# Patient Record
Sex: MALE | HISPANIC OR LATINO | URBAN - METROPOLITAN AREA
[De-identification: names, ages, dates, MRNs, and addresses within clinical notes are randomized per-mention and may not be internally consistent; named-entity substitution may affect disease eponyms.]

---

## 2018-12-03 ENCOUNTER — EMERGENCY (EMERGENCY)
Facility: HOSPITAL | Age: 48
LOS: 0 days | Discharge: HOME | End: 2018-12-03
Attending: EMERGENCY MEDICINE | Admitting: EMERGENCY MEDICINE
Payer: MEDICAID

## 2018-12-03 VITALS
OXYGEN SATURATION: 100 % | DIASTOLIC BLOOD PRESSURE: 90 MMHG | RESPIRATION RATE: 18 BRPM | TEMPERATURE: 97 F | SYSTOLIC BLOOD PRESSURE: 151 MMHG | HEART RATE: 88 BPM

## 2018-12-03 DIAGNOSIS — S62.614A DISPLACED FRACTURE OF PROXIMAL PHALANX OF RIGHT RING FINGER, INITIAL ENCOUNTER FOR CLOSED FRACTURE: ICD-10-CM
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
## 2018-12-03 PROCEDURE — 73130 X-RAY EXAM OF HAND: CPT | Mod: 26,RT

## 2018-12-03 PROCEDURE — 99283 EMERGENCY DEPT VISIT LOW MDM: CPT | Mod: 25

## 2018-12-03 PROCEDURE — 29130 APPL FINGER SPLINT STATIC: CPT | Mod: F8

## 2018-12-03 NOTE — ED STATDOCS - CARE PLAN
Principal Discharge DX:	Closed displaced fracture of proximal phalanx of right ring finger, initial encounter
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
## 2018-12-03 NOTE — ED STATDOCS - PROGRESS NOTE DETAILS
AMADO Dickey:   Patient has been seen, evaluated and orders have been written by the attending in intake. Patient is stable.  I will follow up the results of orders written and I will continue to evaluate/observe the patient..  Swollen ,tender R $th finger , proximal aspect / PIP joint.  Oxana Dickey PA-C Discussed with Dr. mcbride.  Reported comminuted fx to R 4th proximal phalanx.  Directed to splint finger and refer to office tomorrow for further eval.  Oxana Dickey PA-C

## 2018-12-03 NOTE — ED ADULT NURSE NOTE - NSIMPLEMENTINTERV_GEN_ALL_ED
Implemented All Universal Safety Interventions:  [place] to call system. Call bell, personal items and telephone within reach. Instruct patient to call for assistance. Room bathroom lighting operational. Non-slip footwear when patient is off stretcher. Physically safe environment: no spills, clutter or unnecessary equipment. Stretcher in lowest position, wheels locked, appropriate side rails in place.

## 2024-10-31 ENCOUNTER — EMERGENCY (EMERGENCY)
Facility: HOSPITAL | Age: 54
LOS: 0 days | Discharge: HOME | End: 2024-10-31
Attending: EMERGENCY MEDICINE
Payer: MEDICAID

## 2024-10-31 VITALS
RESPIRATION RATE: 19 BRPM | OXYGEN SATURATION: 100 % | WEIGHT: 149.69 LBS | TEMPERATURE: 98 F | DIASTOLIC BLOOD PRESSURE: 94 MMHG | SYSTOLIC BLOOD PRESSURE: 142 MMHG | HEART RATE: 73 BPM
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
## 2024-10-31 DIAGNOSIS — S61.012A LACERATION WITHOUT FOREIGN BODY OF LEFT THUMB WITHOUT DAMAGE TO NAIL, INITIAL ENCOUNTER: ICD-10-CM

## 2024-10-31 DIAGNOSIS — S62.522B DISPLACED FRACTURE OF DISTAL PHALANX OF LEFT THUMB, INITIAL ENCOUNTER FOR OPEN FRACTURE: ICD-10-CM
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
## 2024-10-31 PROCEDURE — 73140 X-RAY EXAM OF FINGER(S): CPT | Mod: 26,LT

## 2024-10-31 PROCEDURE — 12002 RPR S/N/AX/GEN/TRNK2.6-7.5CM: CPT

## 2024-10-31 PROCEDURE — 73140 X-RAY EXAM OF FINGER(S): CPT | Mod: LT

## 2024-10-31 PROCEDURE — 90715 TDAP VACCINE 7 YRS/> IM: CPT

## 2024-10-31 PROCEDURE — 99285 EMERGENCY DEPT VISIT HI MDM: CPT

## 2024-10-31 PROCEDURE — 99284 EMERGENCY DEPT VISIT MOD MDM: CPT | Mod: 25

## 2024-10-31 RX ORDER — OXYCODONE HYDROCHLORIDE 30 MG/1
1 TABLET, FILM COATED, EXTENDED RELEASE ORAL
Qty: 6 | Refills: 0
Start: 2024-10-31 | End: 2024-11-01

## 2024-10-31 RX ORDER — CEPHALEXIN 500 MG
1 CAPSULE ORAL
Qty: 28 | Refills: 0
Start: 2024-10-31 | End: 2024-11-06

## 2024-10-31 RX ORDER — CEPHALEXIN 500 MG
1 CAPSULE ORAL
Qty: 28 | Refills: 0
Start: 2024-10-31 | End: 2024-12-29

## 2024-10-31 RX ORDER — CEFAZOLIN SODIUM 1 G
2000 VIAL (EA) INJECTION ONCE
Refills: 0 | Status: COMPLETED | OUTPATIENT
Start: 2024-10-31 | End: 2024-10-31

## 2024-10-31 RX ORDER — TETANUS TOXOID, REDUCED DIPHTHERIA TOXOID AND ACELLULAR PERTUSSIS VACCINE, ADSORBED 5; 2.5; 8; 8; 2.5 [IU]/.5ML; [IU]/.5ML; UG/.5ML; UG/.5ML; UG/.5ML
0.5 SUSPENSION INTRAMUSCULAR ONCE
Refills: 0 | Status: COMPLETED | OUTPATIENT
Start: 2024-10-31 | End: 2024-10-31

## 2024-10-31 RX ADMIN — TETANUS TOXOID, REDUCED DIPHTHERIA TOXOID AND ACELLULAR PERTUSSIS VACCINE, ADSORBED 0.5 MILLILITER(S): 5; 2.5; 8; 8; 2.5 SUSPENSION INTRAMUSCULAR at 14:29

## 2024-10-31 RX ADMIN — Medication 2000 MILLIGRAM(S): at 14:18

## 2024-10-31 NOTE — CONSULT NOTE ADULT - SUBJECTIVE AND OBJECTIVE BOX
Pt is a 54y Male who presents to the ED with L thumb lac after using power saw at home. Denies head injury, LOC, other injuries. Denies numbness, tingling, fevers, chills, CP, SOB, N/V/D.    Vital Signs (24 Hrs):  T(C): 36.8 (10-31-24 @ 13:54), Max: 36.8 (10-31-24 @ 13:54)  HR: 73 (10-31-24 @ 13:54) (73 - 73)  BP: 142/94 (10-31-24 @ 13:54) (142/94 - 142/94)  RR: 19 (10-31-24 @ 13:54) (19 - 19)  SpO2: 100% (10-31-24 @ 13:54) (100% - 100%)    LABS:      Physical Exam:  General: NAD, pt laying in bed comfortably    LEFT Upper Extremity:   Large lac over volar side of L thumb   C5-T1 SILT  Motor grossly intact throughout axillary/musculocutaneous/radial/median/ulnar/AIN/PIN nerves  + radial pulse  Compartments soft and compressible    Imaging:  XR L Hand: thumb DIP Fx    Procedure: Lac repair  Procedure explained and discussed risks, benefits, and alternatives to procedure with patient at bedside. Patient expressed understanding and all questions were answered. Laceration was closed with sutures and dry dressings were applied. The patient tolerated the procedure well and there were no complications. The patient was neurovascularly intact post-procedure.     A/P:  54y Male who presents with L thumb fx/lac    NWB LUE, keep dressings C/D/I  Analgesics  DC on oral ABx  No acute orthopaedic interventions at this time  Stable for discharge from orthopaedic standpoint   Follow up with Dr. Martinez in office for further management in 1 week  Ortho to sign off, can reconsult with any changes in clinical course  Will discuss plan with Dr. Martinez and notify primary team of any changes to plan

## 2024-10-31 NOTE — ED STATDOCS - PATIENT PORTAL LINK FT
You can access the FollowMyHealth Patient Portal offered by [facility] by registering at the following website: [email or web address]. By joining [employer]’s FollowMyHealth portal, you will also be able to view your health information using other applications (apps) compatible with our system.

## 2024-10-31 NOTE — ED ADULT TRIAGE NOTE - CHIEF COMPLAINT QUOTE
Pt c/o deep laceration to the L thumb while cutting wood at home. No active bleeding at this time. Denies AC/ASA use.

## 2024-10-31 NOTE — ED ADULT NURSE NOTE - OBJECTIVE STATEMENT
pt presenting w/left thumb laceration w/a automatic saw at home while cutting wood. Pt IV and ABX and tetanus completed and pt waiting for ortho

## 2024-10-31 NOTE — ED STATDOCS - PROGRESS NOTE DETAILS
laceration to left tuft of thumb.  Open fracture on xray.  Orthopedic team repaired laceration and to be DC on Keflex.  Follow with Dr. Martinez.  Alexia Flores PA-C 54 year old male with no pertinent PMHx presenting to the ED c/o deep laceration to the left thumb from power saw while cutting wood at home. Pt denies AC/ASA use. Pt is unsure of last of date of last tetanus shot. Pt has no known medical allergies.  Alexia Flores PA-C

## 2024-10-31 NOTE — ED STATDOCS - NSFOLLOWUPINSTRUCTIONS_ED_ALL_ED_FT
Cuidado de un desgarro, en adultos  Laceration Care, Adult  Un desgarro es un javi que puede atravesar todas las capas de la piel hasta el tejido que se encuentra debajo de la piel. Algunos desgarros cicatrizan por sí solos. Otros se deben cerrar con puntos (suturas), grapas, tiras adhesivas para la piel o goma para cerrar la piel.    El cuidado adecuado de un desgarro reduce el riesgo de infección, ayuda a que el desgarro cierre mejor, y puede prevenir la formación de cicatrices.    Consejos generales  Mantenga la herida limpia y seca.  No se rasque ni se toque la herida.  Lávese las adolfo con agua y jabón fabiola al menos 20 segundos antes y después de tocar la herida o cambiarse la venda (vendaje). Use desinfectante para adolfo si no dispone de agua y jabón.  No usedesinfectantes ni antisépticos, mindi alcohol para frotar, para limpiar la herida, a menos que se lo indique ochoa médico.  Si le colocaron un vendaje, cámbielo al menos pamela vez al día o mindi se lo haya indicado el médico. También debe cambiarlo si se moja o se ensucia.  Cómo cuidar del desgarro  Si se utilizaron suturas o grapas:    Mantenga la herida completamente seca fabiola las primeras 24 horas o mindi se lo haya indicado el médico. Transcurrido rickey tiempo, puede ducharse o nathalia ele de inmersión. No se sumerja en agua hasta que le hayan quitado las suturas o grapas.  Limpie la herida pamela vez por día o mindi se lo haya indicado el médico. Para hacer esto:  Lave la herida con agua y jabón.  Enjuáguela con agua para quitar todo el jabón.  Séquela dando palmaditas con pamela toalla limpia. No frote la herida.  Después de limpiar la herida, aplique pamela delgada capa de ungüento con antibiótico, otros ungüentos tópicos, o un vendaje no adherente mindi se lo haya indicado el médico. Renovo ayudará a prevenir infecciones y a evitar que el vendaje se adhiera a la herida.  Medardo que le retiren las suturas o las grapas mindi se lo haya indicado el médico. No retire las suturas ni las grapas usted mismo.  Si se utilizaron tiras adhesivas para la piel:    No deje que las tiras adhesivas para la piel se mojen. Puede bañarse o ducharse, los tenga cuidado de mantener la herida seca.  Si se moja, séquela dando palmaditas con pamela toalla limpia. No frote la herida.  Las tiras adhesivas para la piel se caen solas. Si los bordes de las tiras adhesivas empiezan a despegarse y enroscarse, puede recortar los que estén sueltos. No retire las tiras adhesivas por completo a menos que el médico se lo indique.  Si se utilizó goma para cerrar la piel:    Puede bañarse o ducharse, los tenga cuidado de mantener la herida seca. No sumerja la herida en agua.  Después de ducharse o darse un baño, seque el javi dando palmaditas con pamela toalla limpia. No frote la herida.  No practique actividades que lo allen transpirar mucho hasta que la goma para cerrar la piel se haya caído noah.  No aplique líquidos, cremas ni ungüentos medicinales en la herida mientras todavía tenga la goma para cerrar la piel. De lo contrario, puede aflojar la película antes de que la herida cicatrice.  Si la herida está cubierta con un vendaje, no aplique cinta adhesiva directamente sobre la goma para cerrar la piel. De lo contrario, puede hacer que la goma se despegue antes de que la herida cicatrice.  No toque la goma. La goma para cerrar la piel generalmente permanece sobre la piel de 5 a 10 días y luego se  noah.  Siga estas instrucciones en ochoa casa:  Medicamentos    Use los medicamentos de venta scarlet y los recetados solamente mindi se lo haya indicado el médico.  Si le recetaron un medicamento o ungüento con antibiótico, tómelo o aplíqueselo mindi se lo haya indicado el médico. No deje de usarlo aunque la afección mejore.  Control del dolor y la hinchazón    Si se lo indican, aplique hielo sobre la faina de la lesión. Para hacer esto:  Ponga el hielo en pamela bolsa plástica.  Coloque pamela toalla entre la piel y la bolsa.  Aplique el hielo fabiola 20 minutos, 2 o 3 veces por día.  Retire el hielo si la piel se pone de color ontiveros brillante. Renovo es muy importante. Si no puede sentir dolor, calor o frío, tiene un mayor riesgo de que se dañe la faina.  Fabiola las primeras 24 a 48 horas después de que le hayan reparado el desgarro, cuando esté sentado o acostado, levante (eleve) la faina de la lesión por encima del nivel del corazón.  Instrucciones generales    Two wounds closed with skin glue. One is normal. The other is red with pus and infected.  Evite cualquier actividad que pueda hacer que el desgarro vuelva a abrirse.  Controle la herida todos los días para detectar signos de infección. Esté atento a lo siguiente:  Aumento del enrojecimiento, la hinchazón o el dolor.  Líquido o bud.  Calor.  Pus o mal olor.  Concurra a todas las visitas de seguimiento. Renovo es importante.  Comuníquese con un médico si:  Le aplicaron la vacuna antitetánica y tiene hinchazón, dolor intenso, enrojecimiento o hemorragia en el sitio de la inyección.  La herida cerrada se abre.  Tiene cualquiera de estos signos de infección:  Más enrojecimiento, hinchazón o dolor alrededor de la herida.  Líquido o bud que salen de la herida.  Calor que proviene de la herida.  Pus o mal olor proveniente de la herida.  Fiebre.  Nota un cuerpo extraño en la herida, mindi un trozo de hodges o cecilia.  El dolor no se christelle con los medicamentos.  Observa que la piel cerca de la herida cambia de color.  Necesita cambiar el vendaje con frecuencia.  Presenta pamela nueva erupción cutánea.  Tiene entumecimiento alrededor de la herida.  Solicite ayuda de inmediato si:  Tiene mucha hinchazón alrededor de la herida.  El dolor aumenta repentinamente y es intenso.  Presenta nódulos dolorosos cerca de la herida o en la piel en cualquier faina del cuerpo.  Tiene pamela línea rizwana que sale de la herida.  La herida está en la mano o en el pie, y no puede  correctamente josh de los dedos.  La herida está en la mano o en el pie y observa que los dedos tienen un harper pálido o azulado.  Resumen  Un desgarro es un javi que puede atravesar todas las capas de la piel hasta el tejido que se encuentra debajo de la piel.  Algunos desgarros cicatrizan por sí solos. Otros se deben cerrar con puntos (suturas), grapas, tiras adhesivas para la piel o goma para cerrar la piel.  El cuidado adecuado de un desgarro reduce el riesgo de infección, ayuda a que el desgarro cierre mejor, y puede prevenir la formación de cicatrices.  Esta información no tiene mindi fin reemplazar el consejo del médico. Asegúrese de hacerle al médico cualquier pregunta que tenga.    Fractura de dedo en los adultos  Finger Fracture, Adult  La fractura de dedo es la rotura de cualquiera de los huesos de los dedos. El médico puede ponerle pamela férula o un yeso en el dedo para que no se mueva mientras reyna.    ¿Cuáles son las causas?  La principal causa de pamela fractura de dedo es pamela lesión por lo siguiente:  Deportes.  Pamela caída.  Apretarse el dedo en un cajón o kwesi.  ¿Qué incrementa el riesgo?  Practicar deportes.  Trabajar con máquinas.  Tener pamela afección llamada osteoporosis que hace que los huesos se vuelvan frágiles.  ¿Cuáles son los signos o síntomas?  Dolor, moretones e hinchazón poco después de la lesión.  Rigidez.  Huesos expuestos, en casos muy graves. Renovo se denomina fractura compuesta o fractura abierta.  ¿Cómo se diagnostica?  Un examen físico.  Wen antecedentes médicos.  Wen síntomas.  Pamela radiografía.  ¿Cómo se trata?  El tratamiento depende de la gravedad de la fractura. Si los huesos aún están en ochoa lugar, el médico puede ponerle el dedo en pamela férula. Si hay varios dedos fracturados, es posible que necesite un yeso. Se puede colocar un yeso hasta el codo. Renovo evitará que los dedos y la mano se muevan.    Si los huesos están fuera de lugar, el médico puede reacomodarlos o es posible que tenga que someterse a pamela cirugía.    Es posible que también tenga que hacer ejercicios de fisioterapia para ayudar a que el dedo se mueva mejor y se fortalezca.    Siga estas instrucciones en ochoa casa:  Si tiene pamela férula que se puede retirar:    Hand showing finger splint on fingers and wrist.  Use la férula mindi se lo haya indicado el médico. Quítesela solo si el médico lo autoriza.  Controle todos los días la piel a ochoa alrededor. Informe al médico si observa problemas.  Afloje la férula si los dedos se le adormecen, siente hormigueos o se le enfrían y se tornan azulados.  Mantenga la férula limpia y seca.  Si tiene un yeso que no se puede quitar:    No ejerza presión en ninguna parte del yeso hasta que se haya endurecido. Renovo puede tardar algunas horas.  No introduzca nada en el interior para rascarse la piel. Renovo puede incrementar el riesgo de contraer pamela infección.  Controle la piel alrededor del yeso todos los días. Informe al médico si observa problemas.  Puede aplicar pamela loción en la piel seca alrededor del yeso. No aplique loción en la piel por debajo del yeso.  Mantenga el yeso seco y limpio.  Bañarse    No se dé ele de inmersión, no nade ni use un jacuzzi hasta que se lo autoricen. Pregunte si puede ducharse.  Si la férula o el yeso no son impermeables:  No deje que se mojen.  Cúbralos para ducharse o bañarse. Use pamela cubierta que no permita que entre agua.  Control del dolor, la rigidez y la hinchazón    Si se lo indican, aplique hielo sobre la faina.  Si tiene pamela férula que se puede sacar, quítesela mindi se lo hayan indicado.  Ponga el hielo en pamela bolsa plástica.  Coloque pamela toalla entre la piel y la bolsa o entre el yeso y la bolsa.  Aplique el hielo fabiola 20 minutos, 2 a 3 veces por día.  Si la piel se le pone de color ontiveros brillante, quite el hielo de inmediato para evitar daños en la piel. El riesgo de daño es mayor si no puede sentir dolor, calor o frío.  Mueva los dedos de la mano con frecuencia para reducir la rigidez y la hinchazón.  Cuando esté sentado o acostado, mantenga la faina lesionada por encima del nivel del corazón. Use pamela almohada para sostener la mano según sea necesario.  Actividad    Pregúntele al médico si debe evitar conducir o usar máquinas mientras debby los medicamentos.  Medardo los ejercicios mindi se lo haya indicado el médico.  Retome wen actividades habituales cuando se lo indiquen. Pregunte qué cosas son seguras para que medardo.  Consulte cuándo puede volver a conducir si usted tiene pamela férula o un yeso en el dedo.  Instrucciones generales    No fume ni use vapeadores o productos que tengan nicotina o tabaco. Estos pueden alargar el proceso de curación después de la cirugía. Si necesita ayuda para dejar fumar, hable con ochoa médico.  Use los medicamentos solamente mindi se lo haya indicado el médico.  Concurra a todas las visitas de seguimiento. El médico tendrá que revisar cómo va el proceso de curación del dedo.  Comuníquese con un médico si:  El dolor o la hinchazón empeoran incluso con tratamiento.  Tiene dificultad para  el dedo.  Solicite ayuda de inmediato si:  Ochoa dedo se adormece o se pone tigre.  Esta información no tiene mindi fin reemplazar el consejo del médico. Asegúrese de hacerle al médico cualquier pregunta que tenga.

## 2024-10-31 NOTE — ED STATDOCS - CLINICAL SUMMARY MEDICAL DECISION MAKING FREE TEXT BOX
54 year old male c/o deep laceration to the left thumb from power saw. Plan for antibiotics,  x-rays, and laceration repair.

## 2024-10-31 NOTE — ED STATDOCS - PHYSICAL EXAMINATION
Constitutional: NAD AAOx3  Eyes: EOMI, pupils equal  Head: Normocephalic atraumatic  Mouth: no airway obstruction  Cardiac: regular rate   Resp: Lungs CTAB  GI: Abd s/nt/nd  Neuro: CN2-12 intact  Skin: 4cm laceration to left thumb, cap refill intact, motor intact

## 2024-10-31 NOTE — ED STATDOCS - CARE PROVIDER_API CALL
Dolly Martinez  Orthopaedic Surgery  [address] [place] [address]  Phone: [phone]  Fax: [phone]  Follow Up Time:

## 2024-10-31 NOTE — ED STATDOCS - OBJECTIVE STATEMENT
54 year old male with no pertinent PMHx presenting to the ED c/o deep laceration to the left thumb from power saw while cutting wood at home. Pt denies AC/ASA use. Pt is unsure of last of date of last tetanus shot. Pt has no known medical allergies.

## 2024-11-07 ENCOUNTER — EMERGENCY (EMERGENCY)
Facility: HOSPITAL | Age: 54
LOS: 0 days | Discharge: HOME | End: 2024-11-07
Attending: EMERGENCY MEDICINE
Payer: MEDICAID

## 2024-11-07 VITALS
TEMPERATURE: 98 F | DIASTOLIC BLOOD PRESSURE: 87 MMHG | HEART RATE: 77 BPM | SYSTOLIC BLOOD PRESSURE: 145 MMHG | OXYGEN SATURATION: 100 % | RESPIRATION RATE: 18 BRPM | WEIGHT: 147.27 LBS

## 2024-11-07 DIAGNOSIS — S61.012D LACERATION WITHOUT FOREIGN BODY OF LEFT THUMB WITHOUT DAMAGE TO NAIL, SUBSEQUENT ENCOUNTER: ICD-10-CM

## 2024-11-07 PROCEDURE — 99212 OFFICE O/P EST SF 10 MIN: CPT

## 2024-11-07 PROCEDURE — 99284 EMERGENCY DEPT VISIT MOD MDM: CPT

## 2024-11-07 NOTE — ED STATDOCS - NSFOLLOWUPINSTRUCTIONS_ED_ALL_ED_FT
The stitches placed in left thumb are ABSORBABLE.  They do not need to be removed.  They will dissolve and fall out on their own over time.      Can shower and allow water to run over Left thumb.      If thumb is getting red with discharge from wound, you should return to the ED.  Otherwise, you do not have to come back to the ED.

## 2024-11-07 NOTE — ED STATDOCS - OBJECTIVE STATEMENT
54 year old male with no pertinent PMHx presenting to the ED for suture removal on left thumb. Sutures were placed last week on Thursday. Pt was in  ED 10/31/24 for deep laceration sustained to the left thumb from power saw while cutting wood at home. Pt has no known medical allergies.  #[id number]

## 2024-11-07 NOTE — ED STATDOCS - CLINICAL SUMMARY MEDICAL DECISION MAKING FREE TEXT BOX
54 year old male presenting to the ED for suture removal on left thumb. Saw injury on 10/31. Plan to consult ortho.

## 2024-11-07 NOTE — ED STATDOCS - PROGRESS NOTE DETAILS
54-year-old male presents to the ED for suture removal of wound to his left thumb.  Patient reports on October 31 he cut his left thumb on a saw.  Patient sustained a deep laceration with a power saw and was seen by orthopedics for a hand consult at that time.  On examination it appears that patient has absorbable sutures in place to wound on left thumb I called orthopedics for consult as documentation did not show what type of suture material was used.  Orthopedics will see patient in ED.  Oxana Dickey PA-C Orthopedic resident evaluated patient in the ED.  He applied a dry dressing and used the  line to explain to patient that the sutures that were placed were absorbable.  Patient will not need to have the sutures removed.  Patient can continue getting wound wet and applying a dry dressing.  Patient should return to the ED if signs of infection occur at the wound.  These signs were discussed with the patient in [place]ian.  Will dc home.  Oxana Dickey PA-C

## 2024-11-07 NOTE — ED STATDOCS - PATIENT PORTAL LINK FT
You can access the FollowMyHealth Patient Portal offered by [facility] by registering at the following website: [email or web address]. By joining [employer]’s FollowMyHealth portal, you will also be able to view your health information using other applications (apps) compatible with our system.

## 2024-11-07 NOTE — ED STATDOCS - PHYSICAL EXAMINATION
Constitutional: NAD AAOx3  Eyes: EOMI, pupils equal  Head: Normocephalic atraumatic  Mouth: no airway obstruction  Cardiac: regular rate   Resp: Lungs CTAB  GI: Abd s/nt/nd  Neuro: CN2-12 intact  Skin: left thumb laceration scabbed over, stiches intact, no redness, cap refill less than 2 seconds.

## 2024-11-07 NOTE — CONSULT NOTE ADULT - SUBJECTIVE AND OBJECTIVE BOX
54y Male presents L Thumb laceration 8 days after the laceration was repaired on 10/31. Denies numbness/tingling in the affected extremity. Denies other trauma, head strike/LOC/other orthopedic injuries at this time. Patient is RIGHT hand dominant. Patient ambulates without assistance at baseline.    PAST MEDICAL & SURGICAL HISTORY:  No pertinent past medical history    Home Medications:    Allergies    No Known Allergies    Intolerances    Vital Signs Last 24 Hrs  T(C): 36.7 (07 Nov 2024 12:34), Max: 36.7 (07 Nov 2024 12:34)  T(F): 98 (07 Nov 2024 12:34), Max: 98 (07 Nov 2024 12:34)  HR: 77 (07 Nov 2024 12:34) (77 - 77)  BP: 145/87 (07 Nov 2024 12:34) (145/87 - 145/87)  BP(mean): 105 (07 Nov 2024 12:34) (105 - 105)  RR: 18 (07 Nov 2024 12:34) (18 - 18)  SpO2: 100% (07 Nov 2024 12:34) (100% - 100%)    Parameters below as of 07 Nov 2024 12:34  Patient On (Oxygen Delivery Method): room air      PHYSICAL EXAM  General: NAD, Awake and Alert  LEFT Upper Extremity:   Laceration repair is clean dry and intact with dry blood surrounding 5-0 Chromic gut sutures  Painless passive/active ROM of the thumb  Compartments soft and compressible  Motor: +AXI/MSC/RAD/MED/ULN/AIN/PIN  Sensory: +AXI/RAD/MED/ULN/LABC  + RP      IMAGING:  XR : L Thumb distal phalanx fx (personal read) (performed 10/31)      Assessment/Plan:  54y Male s/p Thumb distal phalanx fx with associated laceration of volar thumb  !!!!!! Incomplete Consult Note, More Information to Follow  !!!!!!    -Pain control as needed  -DVT ppx  -WBAT // PWB // NWB  -Discussed with attending Dr. Martinez, and agrees with above plan    [id number] was used for Sami language for the entirety of this consult:    54y Male presents L Thumb laceration 8 days after the laceration was repaired on 10/31. Denies numbness/tingling in the affected extremity. Denies other trauma, head strike/LOC/other orthopedic injuries at this time. Patient is RIGHT hand dominant. Patient ambulates without assistance at baseline.    PAST MEDICAL & SURGICAL HISTORY:  No pertinent past medical history    Home Medications:    Allergies    No Known Allergies    Intolerances    Vital Signs Last 24 Hrs  T(C): 36.7 (07 Nov 2024 12:34), Max: 36.7 (07 Nov 2024 12:34)  T(F): 98 (07 Nov 2024 12:34), Max: 98 (07 Nov 2024 12:34)  HR: 77 (07 Nov 2024 12:34) (77 - 77)  BP: 145/87 (07 Nov 2024 12:34) (145/87 - 145/87)  BP(mean): 105 (07 Nov 2024 12:34) (105 - 105)  RR: 18 (07 Nov 2024 12:34) (18 - 18)  SpO2: 100% (07 Nov 2024 12:34) (100% - 100%)    Parameters below as of 07 Nov 2024 12:34  Patient On (Oxygen Delivery Method): room air      PHYSICAL EXAM  General: NAD, Awake and Alert  LEFT Upper Extremity:   Laceration repair is clean dry and intact with dry blood surrounding 5-0 Chromic gut sutures  Painless passive/active ROM of the thumb  Compartments soft and compressible  Motor: +AXI/MSC/RAD/MED/ULN/AIN/PIN  Sensory: +AXI/RAD/MED/ULN/LABC  + RP      IMAGING:  XR : L Thumb distal phalanx fx (personal read) (performed 10/31)      Assessment/Plan:  54y Male s/p Thumb distal phalanx fx with associated laceration of volar thumb    -Dressing changed in ED, May change dressing with dry dressing daily; May shower  -5-0 chromic gut sutures will fall out on own  -Pain control as needed  -WBAT L Thumb  -Reviewed signs of infection including redness, warmth, and pain. Patient understood that if signs of infection occur he is to come back to the ED.    -Discussed with attending Dr. Martinez, and agrees with above plan

## 2024-12-23 ENCOUNTER — EMERGENCY (EMERGENCY)
Facility: HOSPITAL | Age: 54
LOS: 0 days | Discharge: HOME | End: 2024-12-23
Attending: EMERGENCY MEDICINE
Payer: MEDICAID

## 2024-12-23 VITALS
OXYGEN SATURATION: 100 % | RESPIRATION RATE: 18 BRPM | TEMPERATURE: 98 F | SYSTOLIC BLOOD PRESSURE: 148 MMHG | WEIGHT: 150.13 LBS | DIASTOLIC BLOOD PRESSURE: 88 MMHG | HEART RATE: 88 BPM

## 2024-12-23 VITALS — HEIGHT: 70 IN

## 2024-12-23 LAB
ANION GAP SERPL CALC-SCNC: 3 MMOL/L — LOW (ref 5–17)
BUN SERPL-MCNC: 13 MG/DL — SIGNIFICANT CHANGE UP (ref 7–23)
CALCIUM SERPL-MCNC: 9.2 MG/DL — SIGNIFICANT CHANGE UP (ref 8.5–10.1)
CHLORIDE SERPL-SCNC: 107 MMOL/L — SIGNIFICANT CHANGE UP (ref 96–108)
CO2 SERPL-SCNC: 26 MMOL/L — SIGNIFICANT CHANGE UP (ref 22–31)
CREAT SERPL-MCNC: 0.89 MG/DL — SIGNIFICANT CHANGE UP (ref 0.5–1.3)
CRP SERPL-MCNC: <2.9 MG/ML — SIGNIFICANT CHANGE UP (ref 0–5)
EGFR: 103 ML/MIN/1.73M2 — SIGNIFICANT CHANGE UP
ERYTHROCYTE [SEDIMENTATION RATE] IN BLOOD: 16 MM/HR — SIGNIFICANT CHANGE UP (ref 0–20)
GLUCOSE SERPL-MCNC: 108 MG/DL — HIGH (ref 70–99)
HCT VFR BLD CALC: 42 % — SIGNIFICANT CHANGE UP (ref 39–50)
HGB BLD-MCNC: 14.4 G/DL — SIGNIFICANT CHANGE UP (ref 13–17)
MCHC RBC-ENTMCNC: 31.6 PG — SIGNIFICANT CHANGE UP (ref 27–34)
MCHC RBC-ENTMCNC: 34.3 G/DL — SIGNIFICANT CHANGE UP (ref 32–36)
MCV RBC AUTO: 92.1 FL — SIGNIFICANT CHANGE UP (ref 80–100)
PLATELET # BLD AUTO: 257 K/UL — SIGNIFICANT CHANGE UP (ref 150–400)
POTASSIUM SERPL-MCNC: 4 MMOL/L — SIGNIFICANT CHANGE UP (ref 3.5–5.3)
POTASSIUM SERPL-SCNC: 4 MMOL/L — SIGNIFICANT CHANGE UP (ref 3.5–5.3)
RBC # BLD: 4.56 M/UL — SIGNIFICANT CHANGE UP (ref 4.2–5.8)
RBC # FLD: 12.9 % — SIGNIFICANT CHANGE UP (ref 10.3–14.5)
SODIUM SERPL-SCNC: 136 MMOL/L — SIGNIFICANT CHANGE UP (ref 135–145)
WBC # BLD: 5.67 K/UL — SIGNIFICANT CHANGE UP (ref 3.8–10.5)
WBC # FLD AUTO: 5.67 K/UL — SIGNIFICANT CHANGE UP (ref 3.8–10.5)

## 2024-12-23 PROCEDURE — 73130 X-RAY EXAM OF HAND: CPT | Mod: 26,LT

## 2024-12-23 PROCEDURE — 86140 C-REACTIVE PROTEIN: CPT

## 2024-12-23 PROCEDURE — 85652 RBC SED RATE AUTOMATED: CPT

## 2024-12-23 PROCEDURE — 73140 X-RAY EXAM OF FINGER(S): CPT | Mod: LT

## 2024-12-23 PROCEDURE — 99284 EMERGENCY DEPT VISIT MOD MDM: CPT | Mod: 25

## 2024-12-23 PROCEDURE — 73140 X-RAY EXAM OF FINGER(S): CPT | Mod: 26,59,LT

## 2024-12-23 PROCEDURE — 80048 BASIC METABOLIC PNL TOTAL CA: CPT

## 2024-12-23 PROCEDURE — 36415 COLL VENOUS BLD VENIPUNCTURE: CPT

## 2024-12-23 PROCEDURE — 85027 COMPLETE CBC AUTOMATED: CPT

## 2024-12-23 PROCEDURE — 87040 BLOOD CULTURE FOR BACTERIA: CPT

## 2024-12-23 PROCEDURE — 73130 X-RAY EXAM OF HAND: CPT | Mod: LT

## 2024-12-23 PROCEDURE — 99285 EMERGENCY DEPT VISIT HI MDM: CPT

## 2024-12-23 PROCEDURE — 96374 THER/PROPH/DIAG INJ IV PUSH: CPT

## 2024-12-23 RX ORDER — CEFAZOLIN SODIUM 10 G
2000 VIAL (EA) INJECTION ONCE
Refills: 0 | Status: COMPLETED | OUTPATIENT
Start: 2024-12-23 | End: 2024-12-23

## 2024-12-23 RX ADMIN — Medication 2000 MILLIGRAM(S): at 11:48

## 2024-12-23 NOTE — ED STATDOCS - PATIENT PORTAL LINK FT
You can access the FollowMyHealth Patient Portal offered by [facility] by registering at the following website: [email or web address]. By joining [employer]’s FollowMyHealth portal, you will also be able to view your health information using other applications (apps) compatible with our system.

## 2024-12-23 NOTE — ED ADULT NURSE NOTE - HIV OFFER
Call to patient-  Advised of message below.  Patient verbalizes understanding and had no further questions or concerns.     Opt out

## 2024-12-23 NOTE — ED ADULT TRIAGE NOTE - CHIEF COMPLAINT QUOTE
Pt presents to the ED for suture removal on left first finger. pt has sutures placed 1 month ago at . [facility]. no other complaints

## 2024-12-23 NOTE — ED ADULT NURSE NOTE - OBJECTIVE STATEMENT
Pt is a 53 yo male who presents to the ED for a wound check. Pt AOX4 and ambulatory. Pt states he cut his finger on a power saw on 10/31. Pt returns to the ED today for a wound check of the 5th digit on the right hand, Wound appears to be green/brown in color. Pt denies any pain at this time. Labs drawn and sent. Pending  results.

## 2024-12-23 NOTE — ED STATDOCS - CARE PROVIDER_API CALL
Dolly Martinez  Orthopaedic Surgery  [address] [place] [address]  Phone: [phone]  Fax: [phone]  Follow Up Time:

## 2024-12-23 NOTE — CONSULT NOTE ADULT - SUBJECTIVE AND OBJECTIVE BOX
Patient is a 54yMale RHD who presents to [place] ED w/ a c/o of left thumb suture removal. Patient presented on 10/31/24 with left thumb distal phalanx fx and volar thumb laceration. The laceration was repaired then with chromic gut sutures. The patient also presented on 11/7 for a wound check as well. He denies office FU. He denies pain, but states that there is expressible pus from the distal thumb suture site. Denies trauma. Denies HS/LOC. Denies fevers or chills. States that he took one pill of amoxicillin yesterday, but states that he purchased it himself. Denies any numbness or tingling. Denies having any other pain elsewhere. Denies any previous orthopedic history. No other orthopedic concerns at this time.    No pertinent past medical history            No Known Allergies      PHYSICAL EXAM:  T(C): 36.6 (12-23-24 @ 10:05), Max: 36.6 (12-23-24 @ 10:05)  HR: 88 (12-23-24 @ 10:05) (88 - 88)  BP: 148/88 (12-23-24 @ 10:05) (148/88 - 148/88)  RR: 18 (12-23-24 @ 10:05) (18 - 18)  SpO2: 100% (12-23-24 @ 10:05) (100% - 100%)    Gen: NAD, Resting comfortably  LEFT Upper Extremity:   2 cm laceration repaired with chromic gut sutures over distal thumb  Expressible puss from sides of thumb laceration  NTTP over the bony prominences of the shoulder/elbow/wrist/hand; Mild TTP of distal thumb  Painless passive/active ROM of the shoulder/elbow/wrist/hand/fingers  C5-T1 SILT  Motor grossly intact throughout axillary/musculocutaneous/radial/median/ulnar/AIN/PIN nerves  + radial pulse  Compartments soft and compressible      Secondary Survey:   RLE/LLE/RUE: No TTP over bony prominences, SILT, palpable pulses, full/painless range of motion, compartments soft    Spine: No bony tenderness. No palpable stepoffs.    Imaging: no evidence of OM of the distal thumb, personal read    Procedure: digital nerve block applied to thumb with 5 ml of lidocaine and 5 ml of bupivicaine 50/50. The thumb was prepped with betadine. The sutures were removed from the laceration site and the superficial soft tissue was debrided. The thumb was thoroughly irrigated with NS. The wound was dressed with bacitracin, xeroform, and dry dressings. Patient tolerated procedure well.    A/P: 54M who presents with L thumb suture abscess    Antibiotics given in ED  Please DC on PO Abxs  Analgesia  NWB LUE in dry dressing  Please start left thumb soaks starting 48 hours from twice a day with water and epsom salt  Please FU with Dr. aMrtinez on Friday for a wound check in office  Ice and elevate as tolerated  No acute orthopedic surgical intervention indicated at this time  Orthopedically stable  Discussed with attending who is in agreement with above plan

## 2024-12-23 NOTE — ED ADULT NURSE NOTE - CHIEF COMPLAINT QUOTE
Pt presents to the ED for suture removal on left first finger. pt has sutures placed 1 month ago at . [facility]. no other complaints

## 2024-12-23 NOTE — ED STATDOCS - NSFOLLOWUPINSTRUCTIONS_ED_ALL_ED_FT
Follow up with Dr. Martinez on 12/27.  Take antibiotics as prescribed.  Starting on Wednesday 12/25, soak thumb in water and epsom salt 2 times a day.  Return to the ED for new or worsening symptoms.      Natalie un seguimiento con el Dr. Martinez el 27/12.  [place] los antibióticos según lo recetado.  A partir del miércoles 25/12, remoja el pulgar en agua y sal de Epsom 2 veces al día.  Regrese al servicio de urgencias si los síntomas aparecen o empeoran.    Absceso    LO QUE NECESITA SABER:    ¿Qué es un absceso?Un absceso es un área bajo la piel donde se acumula pus (fluido infectado). Un absceso es a menudo causado por bacterias, hongos u otros gérmenes que entran en pamela herida abierta. Usted puede tener un absceso en cualquier parte de gimenez cuerpo.  Absceso cutáneo    ¿Qué aumenta mi riesgo de tener un absceso?    Pamela picadura de animal    Un objeto extraño debajo de gimenez piel    Sudor bettina o frecuente    Un problema de migdalia, mindi diabetes u obesidad    Inyección de drogas ilegales  ¿Cuáles son los signos y síntomas de un absceso?Usted podría tener un absceso inflamado, ontiveros y doloroso. El pus se puede filtrar fuera de la masa. El pus será camacho o amarillo y puede oler mal. Es probable que usted tenga enrojecimiento y dolor carmen antes de que aparezca la masa. Si la infección se disemina, puede tener fiebre y escalofríos.    ¿Cómo se diagnostica un absceso?Gimenez médico le examinará el área afectada. Revisará si el absceso drena. Es probable que pamela muestra de líquido del absceso muestre qué es lo que está causando gimenez infección.    ¿Cómo se trata un absceso?    Incisión y drenajees un procedimiento que se usa para drenar el pus y el líquido del absceso. Gimenez médico hará pamela incisión en el absceso para que pueda drenar. Luego le pondrá gasa en la herida y la cubrirá con pamela venda.    La cirugíapodría ser necesaria para quitar el absceso. Es probable que gimenez médico le extirpe el absceso si éste está en eb adolfo o glúteos. La cirugía puede disminuir el riesgo de que el absceso se forme otra vez.  ¿Cómo puedo cuidarme?    Aplique pamela compresa tibia en el absceso.[place] ayudará a que el absceso se karin y drene. Moje pamela toallita en agua tibia los no caliente. Aplicar la compresa blessing 10 minutos. Natalie esto 4 veces al día. No presione el absceso ni trate de abrirlo con pamela aguja. Puede empujar las bacterias de manera más profunda hacia la bud.    No compartir con nadie gimenez ropa, toallas o sábanas.[place] puede propagar la infección a otros.    Lávese las adolfo frecuentemente.[place] ayudará a prevenir la propagación de gérmenes. Use jabón y agua o un ungüento con base de alcohol.  Lavado de adolfo  ¿Qué puedo hacer para cuidar mi herida después que fue drenada?    Siga las instrucciones de gimenez médico sobre el cuidado de eb heridas.Si gimenez médico dice que está lenka, retire cuidadosamente el vendaje y la gasa. Puede que necesite empapar la gasa para poder sacarla de la herida. Limpié la herida y el área a gimenez alrededor según indicaciones. Seque el área y póngase pamela venda nueva y limpia. Cambie eb vendajes cuando se mojen o ensucien.    Pregúntele a gimenez médico cómo cambiarse la gasa en gimenez herida.Cuente el número de apósitos de gasa que se colocan dentro de gimenez herida. No ponga demasiada gasa en la herida. No aprete demasiado la herida con la gasa.  ¿Cuándo roman buscar atención inmediata?    El área alrededor del absceso se pone muy dolorosa, caliente o tiene manchas bai.    Usted tiene fiebre y escalofríos.    Gimenez corazón está latiendo más rápido de lo normal.    Se siente desmayar o confundido.  ¿Cuándo roman llamar a mi médico?    Gimenez absceso se hace más jesús.    El absceso se vuelve a formar.    Usted tiene preguntas o inquietudes acerca de gimenez condición o cuidado.  ACUERDOS SOBRE GIMENEZ CUIDADO:    Usted tiene el derecho de ayudar a planear gimenez cuidado. Aprenda todo lo que pueda sobre gimenez condición y mindi darle tratamiento. Discuta eb opciones de tratamiento con eb médicos para decidir el cuidado que usted desea recibir. Usted siempre tiene el derecho de rechazar el tratamiento.

## 2024-12-23 NOTE — ED STATDOCS - ATTENDING APP SHARED VISIT CONTRIBUTION OF CARE
I,Nelson Ho MD,  performed the initial face to face bedside interview with this patient regarding history of present illness, review of symptoms and relevant past medical, social and family history.  I completed an independent physical examination.  I was the initial provider who evaluated this patient. I have signed out the follow up of any pending tests (i.e. labs, radiological studies) to the ACP.  I have communicated the patient’s plan of care and disposition with the ACP.  The history, relevant review of systems, past medical and surgical history, medical decision making, and physical examination was documented by the scribe in my presence and I attest to the accuracy of the documentation.

## 2024-12-23 NOTE — ED STATDOCS - PROGRESS NOTE DETAILS
Ortho consulted, will come evaluate patient. - Demetra Durant PA-C Ortho evaluated patient for thumb suture abscess, sutures removed and wound debrided. Ancef given in ED. Stable for dc home with PO abx and to f/u with [facility] on 12/27. Strict return precautions were given. All questions and concerns were addressed. - Demetra Durant PA-C

## 2024-12-23 NOTE — ED STATDOCS - OBJECTIVE STATEMENT
53 y/o male presents to the ED for wound check. Pt was seen in ED on 10/31 for left finger laceration from power saw. Laceration was repaired by ortho at that time. Pt presented to the ED on 11/7 for suture removal but it appeared at that time that absorbable sutures were used for repair.  Pt presents today for wound check.  ID#: [id number].

## 2024-12-23 NOTE — ED STATDOCS - SKIN, MLM
Left thumb distal aspect with wound that appears to have opened with dried crusting redness. Full ROM finger. No drainage.

## 2024-12-23 NOTE — ED STATDOCS - WET READ LAUNCH FT
Left voicemail for patient to call back. Attempting to contact and notify of positive Covid result.   There are no Wet Read(s) to document. There are 2 Wet Read(s) to document.

## 2024-12-28 LAB
CULTURE RESULTS: SIGNIFICANT CHANGE UP
CULTURE RESULTS: SIGNIFICANT CHANGE UP
SPECIMEN SOURCE: SIGNIFICANT CHANGE UP
SPECIMEN SOURCE: SIGNIFICANT CHANGE UP